# Patient Record
Sex: MALE | Race: WHITE | Employment: FULL TIME | URBAN - METROPOLITAN AREA
[De-identification: names, ages, dates, MRNs, and addresses within clinical notes are randomized per-mention and may not be internally consistent; named-entity substitution may affect disease eponyms.]

---

## 2022-03-19 PROBLEM — E78.1 HYPERTRIGLYCERIDEMIA: Status: ACTIVE | Noted: 2019-02-20

## 2022-07-30 NOTE — PROGRESS NOTES
Date: 2022      Name: Mariama Mcguire      MRN: [id number]       : 1980       Age: 37 y.o. Sex: male        Samanta Key MD       CC:  No chief complaint on file. HPI:     Mariama Mcguire is a 37 y.o. male who presents for evaluation of an umbilical hernia as a referral from Dr. Ellis Mohs. The hernia has been present for 2-3 years. It has increased in size. It is painful at times. The patient has an anterior chest wall soft tissue mass which is painful at times. The lesion has drained periodically. No trauma reported. PMH:    No past medical history on file. PSH:    No past surgical history on file. MEDS:    Prior to Visit Medications    Not on File       ALLERGIES:      Not on File    SH:         FH:    No family history on file. ROS: The patient has no difficulty with chest pain or shortness of breath. No fever or chills. Comprehensive 13 point review of systems was otherwise unremarkable except as noted above. Physical Exam:     There were no vitals taken for this visit. General: Alert, oriented, cooperative white male in no acute distress. Eyes: Sclera are clear. Conjunctiva and lids within normal limits. No icterus. Ears and Nose: no gross deformities to visual inspection, gross hearing intact  Neck: Supple, trachea midline, no appreciable thyromegaly  Resp: Breathing is  non-labored. Lungs clear to auscultation without wheezing or rhonchi   CV: RRR. No murmurs, rubs or gallops appreciated. Abd: soft, reducible umbilical hernia, active BS'S. Chest: mid-sternal region with an upraised STM having a diameter of 2 cm. No signs of infection. No drainage. Psych:  Mood and affect appropriate. Short-term memory and understanding intact      Assessment/Plan:  Mariama Mcguire is a 37 y.o. male who has signs and symptoms consistent with an umbilical hernia. 1. Open umbilical hernia repair with mesh with excision of an anterior chest wall soft tissue mass.      I went over the risks of bleeding, infection, anesthesia, injury to the small bowel, large bowel, bladder and the risk of recurrence of the hernia. I discussed the use of mesh with its attendant potential problems. I highlighted how important it was to follow the post-op instructions, particularly the lifting restrictions. The patient understood the risks and wished to proceed.     Felicia Vanegas MD  [facility]   7/30/2022  11:46 AM

## 2022-08-04 ENCOUNTER — OFFICE VISIT (OUTPATIENT)
Dept: SURGERY | Age: 42
End: 2022-08-04
Payer: COMMERCIAL

## 2022-08-04 VITALS — WEIGHT: 212.1 LBS | HEART RATE: 83 BPM | DIASTOLIC BLOOD PRESSURE: 76 MMHG | SYSTOLIC BLOOD PRESSURE: 128 MMHG

## 2022-08-04 DIAGNOSIS — K42.9 UMBILICAL HERNIA WITHOUT OBSTRUCTION AND WITHOUT GANGRENE: Primary | ICD-10-CM

## 2022-08-04 DIAGNOSIS — M79.89 SOFT TISSUE MASS: ICD-10-CM

## 2022-08-04 PROCEDURE — 99203 OFFICE O/P NEW LOW 30 MIN: CPT | Performed by: SURGERY

## 2022-08-04 PROCEDURE — G8427 DOCREV CUR MEDS BY ELIG CLIN: HCPCS | Performed by: SURGERY

## 2022-08-04 PROCEDURE — G8421 BMI NOT CALCULATED: HCPCS | Performed by: SURGERY

## 2022-08-04 PROCEDURE — 1036F TOBACCO NON-USER: CPT | Performed by: SURGERY

## 2022-08-04 RX ORDER — IBUPROFEN 200 MG
200 TABLET ORAL EVERY 6 HOURS PRN
COMMUNITY

## 2022-08-04 RX ORDER — CETIRIZINE HYDROCHLORIDE 10 MG/1
10 TABLET ORAL DAILY
COMMUNITY

## 2022-10-02 NOTE — H&P
Date: 10/2/2022      Name: Garrett Negrete      MRN: [id number]       : 1980       Age: 40 y.o. Sex: male        Davian Bennett MD       CC:  No chief complaint on file. HPI:     Garrett Negrete is a 40 y.o. male who presents for evaluation of an umbilical hernia as a referral from Dr. Aurora Alarcon. The hernia has been present for 2-3 years. It has increased in size. It is painful at times. The patient has an anterior chest wall soft tissue mass which is painful at times. The lesion has drained periodically. No trauma reported. PMH:    No past medical history on file. PSH:    Past Surgical History:   Procedure Laterality Date    ORTHOPEDIC SURGERY      acl repair left knee, [id number]- left knee meniscus, right ankle [id number], right ankle - has three screws       MEDS:    Prior to Visit Medications    Medication Sig Taking? Authorizing Provider   cetirizine (ZYRTEC) 10 MG tablet Take 10 mg by mouth in the morning. Historical Provider, MD   ibuprofen (ADVIL;MOTRIN) 200 MG tablet Take 200 mg by mouth every 6 hours as needed for Pain  Historical Provider, MD       ALLERGIES:      No Known Allergies    SH:    Social History     Tobacco Use    Smoking status: Never    Smokeless tobacco: Never       FH:    No family history on file. ROS: The patient has no difficulty with chest pain or shortness of breath. No fever or chills. Comprehensive 13 point review of systems was otherwise unremarkable except as noted above. Physical Exam:     There were no vitals taken for this visit. General: Alert, oriented, cooperative white male in no acute distress. Eyes: Sclera are clear. Conjunctiva and lids within normal limits. No icterus. Ears and Nose: no gross deformities to visual inspection, gross hearing intact  Neck: Supple, trachea midline, no appreciable thyromegaly  Resp: Breathing is  non-labored. Lungs clear to auscultation without wheezing or rhonchi   CV: RRR.  No murmurs, rubs or gallops appreciated. Abd: soft, reducible umbilical hernia, active BS'S. Chest: mid-sternal region with an upraised STM having a diameter of 2 cm. No signs of infection. No drainage. Psych:  Mood and affect appropriate. Short-term memory and understanding intact      Assessment/Plan:  Jose C Corona is a 40 y.o. male who has signs and symptoms consistent with an umbilical hernia. 1. Open umbilical hernia repair with mesh with excision of an anterior chest wall soft tissue mass. I went over the risks of bleeding, infection, anesthesia, injury to the small bowel, large bowel, bladder and the risk of recurrence of the hernia. I discussed the use of mesh with its attendant potential problems. I highlighted how important it was to follow the post-op instructions, particularly the lifting restrictions. The patient understood the risks and wished to proceed.     Rambo Handley MD  [facility]   10/2/2022  8:32 AM

## 2022-10-06 ENCOUNTER — ANESTHESIA EVENT (OUTPATIENT)
Dept: SURGERY | Age: 42
End: 2022-10-06

## 2022-10-06 RX ORDER — ACETAMINOPHEN 500 MG
1000 TABLET ORAL 2 TIMES DAILY
COMMUNITY
Start: 2022-10-06 | End: 2022-10-07

## 2022-10-06 RX ORDER — LANOLIN ALCOHOL/MO/W.PET/CERES
3 CREAM (GRAM) TOPICAL DAILY
COMMUNITY

## 2022-10-06 NOTE — PERIOP NOTE
Phone pre-assessment completed. Verified name&  . Order to obtain consent  found in EHR &  matches case posting. Type 2 surgery,  assessment complete. Orders received. Labs per surgeon: none  Labs per anesthesia protocol: none      Medical/surgical history questions answered at their best of ability. All prior to admission medications reviewed and documented in [facility]. Instructed to take ONLY THE FOLLOWING MEDICATIONS ON THE DAY OF SURGERY according to anesthesia guidelines with sips of water: none . VERBALIZES UNDERSTANDING TO HOLD ALL VITAMINS AND SUPPLEMENTS  and NSAIDS (aspirin, naproxen, ibuprofen) IMMEDIATELY PER ANESTHESIA PROTOCOL. Instructed on the following:    > Arrive at MAIN Entrance, time of arrival to be called the day before by 1700  > NPO after midnight including gum, mints, and ice chips  > Responsible adult must drive patient to the hospital, stay during surgery, and patient will need supervision 24 hours after anesthesia  > Use antibacterial soap in shower the night before surgery and on the morning of surgery  > All piercings must be removed prior to arrival.    > Leave all valuables (money and jewelry) at home but bring insurance card and ID on DOS.   > You may be required to pay a deductible or co-pay on the day of your procedure. You can pre-pay by calling [phone] if your surgery is at the [facility] or [phone] if your surgery is at the [facility]. > Do not wear make-up, nail polish, lotions, cologne, perfumes, powders, or oil on skin. Artificial nails are not permitted. Teach back successful and demonstrates knowledge of instruction. You will received a call from the pre-op nurse by 5 pm on the business day prior to the scheduled procedure. If you have not spoken with a nurse, please check your voicemail. If you have not received an arrival time by 5 pm, please call [phone].

## 2022-10-06 NOTE — PERIOP NOTE
Unable to leave message at patient contact due to \"voicemail full\" and alternate contact had no voicemail available. Procedure on 10/7 with Dr. Cortes Vargas. OR time of 1420 with pre-op arrival of 1200.

## 2022-10-06 NOTE — PERIOP NOTE
Directly informed patient of pre op arrival time 1200 on 10/7. All questions answered. Pre op instructions reviewed. Left contact information for any additional questions or needs.

## 2022-10-07 ENCOUNTER — ANESTHESIA (OUTPATIENT)
Dept: SURGERY | Age: 42
End: 2022-10-07

## 2022-10-07 ENCOUNTER — HOSPITAL ENCOUNTER (OUTPATIENT)
Age: 42
Setting detail: OUTPATIENT SURGERY
Discharge: HOME | End: 2022-10-07
Attending: SURGERY | Admitting: SURGERY
Payer: COMMERCIAL

## 2022-10-07 VITALS
DIASTOLIC BLOOD PRESSURE: 57 MMHG | SYSTOLIC BLOOD PRESSURE: 101 MMHG | TEMPERATURE: 98 F | RESPIRATION RATE: 16 BRPM | HEART RATE: 55 BPM | WEIGHT: 204.2 LBS | HEIGHT: 70 IN | OXYGEN SATURATION: 92 % | BODY MASS INDEX: 29.23 KG/M2

## 2022-10-07 DIAGNOSIS — K42.9 UMBILICAL HERNIA WITHOUT OBSTRUCTION AND WITHOUT GANGRENE: Primary | ICD-10-CM

## 2022-10-07 PROCEDURE — 49585 REPAIR UMBILICAL HERN,5+Y/O,REDUC: CPT | Performed by: SURGERY

## 2022-10-07 PROCEDURE — 3700000001 HC ADD 15 MINUTES (ANESTHESIA): Performed by: SURGERY

## 2022-10-07 PROCEDURE — 6360000002 HC RX W HCPCS: Performed by: ANESTHESIOLOGY

## 2022-10-07 PROCEDURE — 2709999900 HC NON-CHARGEABLE SUPPLY: Performed by: SURGERY

## 2022-10-07 PROCEDURE — C1781 MESH (IMPLANTABLE): HCPCS | Performed by: SURGERY

## 2022-10-07 PROCEDURE — 2580000003 HC RX 258: Performed by: ANESTHESIOLOGY

## 2022-10-07 PROCEDURE — 3600000003 HC SURGERY LEVEL 3 BASE: Performed by: SURGERY

## 2022-10-07 PROCEDURE — 3600000013 HC SURGERY LEVEL 3 ADDTL 15MIN: Performed by: SURGERY

## 2022-10-07 PROCEDURE — 6370000000 HC RX 637 (ALT 250 FOR IP): Performed by: ANESTHESIOLOGY

## 2022-10-07 PROCEDURE — 7100000010 HC PHASE II RECOVERY - FIRST 15 MIN: Performed by: SURGERY

## 2022-10-07 PROCEDURE — 6360000002 HC RX W HCPCS: Performed by: NURSE ANESTHETIST, CERTIFIED REGISTERED

## 2022-10-07 PROCEDURE — 11404 EXC TR-EXT B9+MARG 3.1-4 CM: CPT | Performed by: SURGERY

## 2022-10-07 PROCEDURE — 3700000000 HC ANESTHESIA ATTENDED CARE: Performed by: SURGERY

## 2022-10-07 PROCEDURE — 2500000003 HC RX 250 WO HCPCS: Performed by: NURSE ANESTHETIST, CERTIFIED REGISTERED

## 2022-10-07 PROCEDURE — 6360000002 HC RX W HCPCS: Performed by: SURGERY

## 2022-10-07 PROCEDURE — 7100000001 HC PACU RECOVERY - ADDTL 15 MIN: Performed by: SURGERY

## 2022-10-07 PROCEDURE — 88304 TISSUE EXAM BY PATHOLOGIST: CPT

## 2022-10-07 PROCEDURE — 7100000000 HC PACU RECOVERY - FIRST 15 MIN: Performed by: SURGERY

## 2022-10-07 PROCEDURE — 2500000003 HC RX 250 WO HCPCS: Performed by: SURGERY

## 2022-10-07 PROCEDURE — 7100000011 HC PHASE II RECOVERY - ADDTL 15 MIN: Performed by: SURGERY

## 2022-10-07 DEVICE — IMPLANTABLE DEVICE: Type: IMPLANTABLE DEVICE | Site: UMBILICAL | Status: FUNCTIONAL

## 2022-10-07 RX ORDER — SODIUM CHLORIDE 9 MG/ML
INJECTION, SOLUTION INTRAVENOUS PRN
Status: DISCONTINUED | OUTPATIENT
Start: 2022-10-07 | End: 2022-10-07 | Stop reason: HOSPADM

## 2022-10-07 RX ORDER — HYDROMORPHONE HYDROCHLORIDE 2 MG/ML
0.5 INJECTION, SOLUTION INTRAMUSCULAR; INTRAVENOUS; SUBCUTANEOUS EVERY 5 MIN PRN
Status: DISCONTINUED | OUTPATIENT
Start: 2022-10-07 | End: 2022-10-07 | Stop reason: HOSPADM

## 2022-10-07 RX ORDER — PROPOFOL 10 MG/ML
INJECTION, EMULSION INTRAVENOUS PRN
Status: DISCONTINUED | OUTPATIENT
Start: 2022-10-07 | End: 2022-10-07 | Stop reason: SDUPTHER

## 2022-10-07 RX ORDER — ONDANSETRON 2 MG/ML
INJECTION INTRAMUSCULAR; INTRAVENOUS PRN
Status: DISCONTINUED | OUTPATIENT
Start: 2022-10-07 | End: 2022-10-07 | Stop reason: SDUPTHER

## 2022-10-07 RX ORDER — LIDOCAINE HYDROCHLORIDE 10 MG/ML
1 INJECTION, SOLUTION INFILTRATION; PERINEURAL
Status: DISCONTINUED | OUTPATIENT
Start: 2022-10-07 | End: 2022-10-07 | Stop reason: HOSPADM

## 2022-10-07 RX ORDER — OXYCODONE HYDROCHLORIDE AND ACETAMINOPHEN 5; 325 MG/1; MG/1
1 TABLET ORAL EVERY 6 HOURS PRN
Qty: 20 TABLET | Refills: 0 | Status: SHIPPED | OUTPATIENT
Start: 2022-10-07 | End: 2022-10-12

## 2022-10-07 RX ORDER — SODIUM CHLORIDE 0.9 % (FLUSH) 0.9 %
5-40 SYRINGE (ML) INJECTION PRN
Status: DISCONTINUED | OUTPATIENT
Start: 2022-10-07 | End: 2022-10-07 | Stop reason: HOSPADM

## 2022-10-07 RX ORDER — SODIUM CHLORIDE 0.9 % (FLUSH) 0.9 %
5-40 SYRINGE (ML) INJECTION EVERY 12 HOURS SCHEDULED
Status: DISCONTINUED | OUTPATIENT
Start: 2022-10-07 | End: 2022-10-07 | Stop reason: HOSPADM

## 2022-10-07 RX ORDER — MIDAZOLAM HYDROCHLORIDE 2 MG/2ML
2 INJECTION, SOLUTION INTRAMUSCULAR; INTRAVENOUS
Status: DISCONTINUED | OUTPATIENT
Start: 2022-10-07 | End: 2022-10-07 | Stop reason: HOSPADM

## 2022-10-07 RX ORDER — FENTANYL CITRATE 50 UG/ML
INJECTION, SOLUTION INTRAMUSCULAR; INTRAVENOUS PRN
Status: DISCONTINUED | OUTPATIENT
Start: 2022-10-07 | End: 2022-10-07 | Stop reason: SDUPTHER

## 2022-10-07 RX ORDER — SODIUM CHLORIDE, SODIUM LACTATE, POTASSIUM CHLORIDE, CALCIUM CHLORIDE 600; 310; 30; 20 MG/100ML; MG/100ML; MG/100ML; MG/100ML
INJECTION, SOLUTION INTRAVENOUS CONTINUOUS
Status: DISCONTINUED | OUTPATIENT
Start: 2022-10-07 | End: 2022-10-07 | Stop reason: HOSPADM

## 2022-10-07 RX ORDER — ACETAMINOPHEN 500 MG
1000 TABLET ORAL ONCE
Status: COMPLETED | OUTPATIENT
Start: 2022-10-07 | End: 2022-10-07

## 2022-10-07 RX ORDER — KETOROLAC TROMETHAMINE 30 MG/ML
INJECTION, SOLUTION INTRAMUSCULAR; INTRAVENOUS PRN
Status: DISCONTINUED | OUTPATIENT
Start: 2022-10-07 | End: 2022-10-07 | Stop reason: SDUPTHER

## 2022-10-07 RX ORDER — ROCURONIUM BROMIDE 10 MG/ML
INJECTION, SOLUTION INTRAVENOUS PRN
Status: DISCONTINUED | OUTPATIENT
Start: 2022-10-07 | End: 2022-10-07 | Stop reason: SDUPTHER

## 2022-10-07 RX ORDER — DEXAMETHASONE SODIUM PHOSPHATE 4 MG/ML
INJECTION, SOLUTION INTRA-ARTICULAR; INTRALESIONAL; INTRAMUSCULAR; INTRAVENOUS; SOFT TISSUE PRN
Status: DISCONTINUED | OUTPATIENT
Start: 2022-10-07 | End: 2022-10-07 | Stop reason: SDUPTHER

## 2022-10-07 RX ORDER — LIDOCAINE HYDROCHLORIDE 20 MG/ML
INJECTION, SOLUTION EPIDURAL; INFILTRATION; INTRACAUDAL; PERINEURAL PRN
Status: DISCONTINUED | OUTPATIENT
Start: 2022-10-07 | End: 2022-10-07 | Stop reason: SDUPTHER

## 2022-10-07 RX ORDER — OXYCODONE HYDROCHLORIDE 5 MG/1
5 TABLET ORAL
Status: COMPLETED | OUTPATIENT
Start: 2022-10-07 | End: 2022-10-07

## 2022-10-07 RX ORDER — MEPERIDINE HYDROCHLORIDE 25 MG/ML
12.5 INJECTION INTRAMUSCULAR; INTRAVENOUS; SUBCUTANEOUS EVERY 5 MIN PRN
Status: DISCONTINUED | OUTPATIENT
Start: 2022-10-07 | End: 2022-10-07 | Stop reason: HOSPADM

## 2022-10-07 RX ORDER — PROCHLORPERAZINE EDISYLATE 5 MG/ML
5 INJECTION INTRAMUSCULAR; INTRAVENOUS
Status: DISCONTINUED | OUTPATIENT
Start: 2022-10-07 | End: 2022-10-07 | Stop reason: HOSPADM

## 2022-10-07 RX ORDER — ONDANSETRON 2 MG/ML
4 INJECTION INTRAMUSCULAR; INTRAVENOUS
Status: COMPLETED | OUTPATIENT
Start: 2022-10-07 | End: 2022-10-07

## 2022-10-07 RX ORDER — BUPIVACAINE HYDROCHLORIDE 5 MG/ML
INJECTION, SOLUTION EPIDURAL; INTRACAUDAL PRN
Status: DISCONTINUED | OUTPATIENT
Start: 2022-10-07 | End: 2022-10-07 | Stop reason: HOSPADM

## 2022-10-07 RX ADMIN — ACETAMINOPHEN 1000 MG: 500 TABLET, FILM COATED ORAL at 12:39

## 2022-10-07 RX ADMIN — FENTANYL CITRATE 100 MCG: 50 INJECTION, SOLUTION INTRAMUSCULAR; INTRAVENOUS at 15:11

## 2022-10-07 RX ADMIN — DEXAMETHASONE SODIUM PHOSPHATE 4 MG: 4 INJECTION, SOLUTION INTRAMUSCULAR; INTRAVENOUS at 15:18

## 2022-10-07 RX ADMIN — SODIUM CHLORIDE, SODIUM LACTATE, POTASSIUM CHLORIDE, AND CALCIUM CHLORIDE: 600; 310; 30; 20 INJECTION, SOLUTION INTRAVENOUS at 15:35

## 2022-10-07 RX ADMIN — ONDANSETRON 4 MG: 2 INJECTION INTRAMUSCULAR; INTRAVENOUS at 16:01

## 2022-10-07 RX ADMIN — SODIUM CHLORIDE, SODIUM LACTATE, POTASSIUM CHLORIDE, AND CALCIUM CHLORIDE: 600; 310; 30; 20 INJECTION, SOLUTION INTRAVENOUS at 13:02

## 2022-10-07 RX ADMIN — PROPOFOL 200 MG: 10 INJECTION, EMULSION INTRAVENOUS at 15:11

## 2022-10-07 RX ADMIN — ROCURONIUM BROMIDE 35 MG: 50 INJECTION, SOLUTION INTRAVENOUS at 15:11

## 2022-10-07 RX ADMIN — OXYCODONE HYDROCHLORIDE 5 MG: 5 TABLET ORAL at 16:01

## 2022-10-07 RX ADMIN — ONDANSETRON 4 MG: 2 INJECTION INTRAMUSCULAR; INTRAVENOUS at 15:18

## 2022-10-07 RX ADMIN — KETOROLAC TROMETHAMINE 30 MG: 30 INJECTION, SOLUTION INTRAMUSCULAR; INTRAVENOUS at 15:18

## 2022-10-07 RX ADMIN — HYDROMORPHONE HYDROCHLORIDE 0.5 MG: 2 INJECTION, SOLUTION INTRAMUSCULAR; INTRAVENOUS; SUBCUTANEOUS at 16:21

## 2022-10-07 RX ADMIN — Medication 2000 MG: at 15:15

## 2022-10-07 RX ADMIN — LIDOCAINE HYDROCHLORIDE 100 MG: 20 INJECTION, SOLUTION EPIDURAL; INFILTRATION; INTRACAUDAL; PERINEURAL at 15:11

## 2022-10-07 ASSESSMENT — PAIN DESCRIPTION - LOCATION
LOCATION: ABDOMEN
LOCATION: ABDOMEN

## 2022-10-07 ASSESSMENT — PAIN SCALES - GENERAL
PAINLEVEL_OUTOF10: 4
PAINLEVEL_OUTOF10: 2

## 2022-10-07 ASSESSMENT — PAIN - FUNCTIONAL ASSESSMENT
PAIN_FUNCTIONAL_ASSESSMENT: 0-10
PAIN_FUNCTIONAL_ASSESSMENT: NONE - DENIES PAIN
PAIN_FUNCTIONAL_ASSESSMENT: 0-10

## 2022-10-07 NOTE — BRIEF OP NOTE
Brief Postoperative Note      Patient: Arnetha Cabot  YOB: 1980  MRN: [id number]    Date of Procedure: 10/7/2022    Pre-Op Diagnosis: Umbilical hernia without obstruction or gangrene [K42.9]  Mass of soft tissue [M79.89]    Post-Op Diagnosis: Same       Procedure: 1. Open umbilical hernia repair with mesh  2. Excision of an anterior chest wall soft tissue mass measuring 4 cm x 2cm x 2 cm    Surgeon(s):  Carlos Alberto Liriano MD    Assistant:  * No surgical staff found *    Anesthesia: General    Estimated Blood Loss (mL): Minimal    Complications: None    Specimens:   ID Type Source Tests Collected by Time Destination   A : Anterior Chest Wall Soft Tissue Mass Tissue Chest SURGICAL PATHOLOGY Carlos Alberto Liriano MD 10/7/2022 1532        Implants:  Implant Name Type Inv. Item Serial No.  Lot No. LRB No. Used Action   MESH KELY SM DIA4. 3CM VENTRAL POLYPR EPTFE CIR SELF EXP - [id number]  MESH KELY SM DIA4. 3CM VENTRAL POLYPR EPTFE CIR SELF EXP  BARD DAVOL-WD [id number] N/A 1 Implanted         Drains: * No LDAs found *    Findings: See dictated note    Electronically signed by Angela Juares MD on 10/7/2022 at 3:39 PM

## 2022-10-07 NOTE — ANESTHESIA POSTPROCEDURE EVALUATION
Department of Anesthesiology  Postprocedure Note    Patient: Ton Limon  MRN: [id number]  YOB: 1980  Date of evaluation: 10/7/2022      Procedure Summary     Date: 10/07/22 Room / Location: [facility] MAIN OR 07 / [facility] MAIN OR    Anesthesia Start: 1504 Anesthesia Stop: 5451    Procedures: HERNIA UMBILICAL REPAIR WITH MESH (Abdomen)      CHEST WALL LESION BIOPSY EXCISION (Chest) Diagnosis:       Umbilical hernia without obstruction or gangrene      Mass of soft tissue      (Umbilical hernia without obstruction or gangrene [K42.9])      (Mass of soft tissue [M79.89])    Providers: Justice Valerio MD Responsible Provider: Ozzy Mcclendon MD    Anesthesia Type: general ASA Status: 2          Anesthesia Type: No value filed.     Casandra Phase I: Casandra Score: 10    Casandra Phase II: Casandra Score: 10      Anesthesia Post Evaluation    Patient location during evaluation: PACU  Patient participation: complete - patient participated  Level of consciousness: awake and awake and alert  Airway patency: patent  Nausea & Vomiting: no nausea  Complications: no  Cardiovascular status: hemodynamically stable  Respiratory status: acceptable  Hydration status: euvolemic  Multimodal analgesia pain management approach

## 2022-10-07 NOTE — ANESTHESIA PROCEDURE NOTES
Airway  Date/Time: 10/7/2022 3:14 PM  Urgency: elective    Airway not difficult    General Information and Staff    Patient location during procedure: OR  Resident/CRNA: KAYLEY Candelario - CRNA    Indications and Patient Condition  Indications for airway management: anesthesia  Spontaneous Ventilation: absent  Sedation level: deep  Preoxygenated: yes  Patient position: sniffing  MILS maintained throughout  Mask difficulty assessment: vent by bag mask + OA or adjuvant +/- NMBA    Final Airway Details  Final airway type: endotracheal airway      Successful airway: ETT  Cuffed: yes   Successful intubation technique: direct laryngoscopy  Facilitating devices/methods: intubating stylet and cricoid pressure  Endotracheal tube insertion site: oral  Blade: Anna  Blade size: #3  ETT size (mm): 8.0  Cormack-Lehane Classification: grade I - full view of glottis  Placement verified by: chest auscultation and capnometry   Measured from: teeth  ETT to teeth (cm): 23  Number of attempts at approach: 1  Ventilation between attempts: bag mask  Number of other approaches attempted: 0    no

## 2022-10-07 NOTE — ANESTHESIA PRE PROCEDURE
Department of Anesthesiology  Preprocedure Note       Name:  Dotty Marie   Age:  40 y.o.  :  1980                                          MRN:  [id number]         Date:  10/7/2022      Surgeon: Sheri Ashraf):  Radha Solis MD    Procedure: Procedure(s): HERNIA UMBILICAL REPAIR WITH MESH  CHEST WALL LESION BIOPSY EXCISION    Medications prior to admission:   Prior to Admission medications    Medication Sig Start Date End Date Taking? Authorizing Provider   melatonin 3 MG TABS tablet Take 3 mg by mouth daily   Yes Historical Provider, MD   acetaminophen (TYLENOL) 500 MG tablet Take 1,000 mg by mouth in the morning and at bedtime 10/6/22 10/7/22 Yes Historical Provider, MD   cetirizine (ZYRTEC) 10 MG tablet Take 10 mg by mouth in the morning.     Historical Provider, MD   ibuprofen (ADVIL;MOTRIN) 200 MG tablet Take 200 mg by mouth every 6 hours as needed for Pain    Historical Provider, MD       Current medications:    Current Facility-Administered Medications   Medication Dose Route Frequency Provider Last Rate Last Admin    [START ON 10/8/2022] ceFAZolin (ANCEF) 2000 mg in sterile water 20 mL IV syringe  2,000 mg IntraVENous On Call Radha Solis MD        lidocaine 1 % injection 1 mL  1 mL IntraDERmal Once PRN Jonathan Allred MD        lactated ringers infusion   IntraVENous Continuous Jonathan Allred MD        sodium chloride flush 0.9 % injection 5-40 mL  5-40 mL IntraVENous 2 times per day Jonathan Allred MD        sodium chloride flush 0.9 % injection 5-40 mL  5-40 mL IntraVENous PRN Jonathan Allred MD        0.9 % sodium chloride infusion   IntraVENous PRN Jonathan Allred MD        midazolam PF (VERSED) injection 2 mg  2 mg IntraVENous Once PRN Jonathan Allred MD           Allergies:  No Known Allergies    Problem List:    Patient Active Problem List   Diagnosis Code    ADD (attention deficit disorder) F98.8    Hypertriglyceridemia E78.1    Blood pressure elevated without history of HTN R03.0       Past Medical History:        Diagnosis Date    Palpitations     Prolonged emergence from general anesthesia 1997    after ACL repair       Past Surgical History:        Procedure Laterality Date    ANTERIOR CRUCIATE LIGAMENT REPAIR Left 1997    KNEE ARTHROSCOPY Left 2009    ORTHOPEDIC SURGERY  2002    right ankle- screws/pins       Social History:    Social History     Tobacco Use    Smoking status: Never    Smokeless tobacco: Never   Substance Use Topics    Alcohol use: Yes     Alcohol/week: 5.0 standard drinks     Types: 5 Cans of beer per week                                Counseling given: Not Answered      Vital Signs (Current):   Vitals:    10/06/22 1504 10/07/22 1219   BP:  134/79   Pulse:  56   Resp:  12   Temp:  98.5 °F (36.9 °C)   TempSrc:  Oral   SpO2:  98%   Weight: 205 lb (93 kg) 204 lb 3.2 oz (92.6 kg)   Height: 5' 10\" (1.778 m) 5' 10\" (1.778 m)                                              BP Readings from Last 3 Encounters:   10/07/22 134/79   08/04/22 128/76       NPO Status: Time of last liquid consumption: 2200                        Time of last solid consumption: 2200                        Date of last liquid consumption: 10/06/22                        Date of last solid food consumption: 10/06/22    BMI:   Wt Readings from Last 3 Encounters:   10/07/22 204 lb 3.2 oz (92.6 kg)   08/04/22 212 lb 1.6 oz (96.2 kg)     Body mass index is 29.3 kg/m². CBC: No results found for: WBC, RBC, HGB, HCT, MCV, RDW, PLT    CMP: No results found for: NA, K, CL, CO2, BUN, CREATININE, GFRAA, AGRATIO, LABGLOM, GLUCOSE, GLU, PROT, CALCIUM, BILITOT, ALKPHOS, AST, ALT    POC Tests: No results for input(s): POCGLU, POCNA, POCK, POCCL, POCBUN, POCHEMO, POCHCT in the last 72 hours.     Coags: No results found for: PROTIME, INR, APTT    HCG (If Applicable): No results found for: PREGTESTUR, PREGSERUM, HCG, HCGQUANT     ABGs: No results found for: PHART, PO2ART, BOT2HNQ, PIS3LVK, BEART, D6VYOWNK Type & Screen (If Applicable):  No results found for: LABABO, LABRH    Drug/Infectious Status (If Applicable):  No results found for: HIV, HEPCAB    COVID-19 Screening (If Applicable): No results found for: COVID19        Anesthesia Evaluation  Patient summary reviewed  Airway: Mallampati: I  TM distance: >3 FB   Neck ROM: full     Dental: normal exam         Pulmonary:Negative Pulmonary ROS and normal exam  breath sounds clear to auscultation                             Cardiovascular:Negative CV ROS  Exercise tolerance: good (>4 METS),           Rhythm: regular  Rate: normal                 ROS comment: Pt denies recent CP, SOB or changes in functional status     Neuro/Psych:   Negative Neuro/Psych ROS              GI/Hepatic/Renal: Neg GI/Hepatic/Renal ROS            Endo/Other:                      ROS comment: Obesity Abdominal:              PE comment: Deferred   Vascular: negative vascular ROS. Other Findings:           Anesthesia Plan      general     ASA 2       Induction: intravenous. Anesthetic plan and risks discussed with patient and spouse.                         Franci Edmonds MD   10/7/2022

## 2022-10-07 NOTE — INTERVAL H&P NOTE
Update History & Physical    The patient's History and Physical of 10/2/22 was reviewed with the patient and I examined the patient. There was no change. The surgical site was confirmed by the patient and me. Plan: The risks, benefits, expected outcome, and alternative to the recommended procedure have been discussed with the patient. Patient understands and wants to proceed with the procedure.      Electronically signed by Gardenia Worley MD on 10/7/2022 at 11:54 AM

## 2022-10-08 NOTE — OP NOTE
[facility]  OPERATIVE REPORT    Name:  Romeo Sosa  MR#:  [id number]  :  1980  ACCOUNT #:  [de-identified]  DATE OF SERVICE:  10/07/2022    PREOPERATIVE DIAGNOSES:  1.  Umbilical hernia. 2.  Anterior chest wall soft tissue mass. POSTOPERATIVE DIAGNOSES:  1.  Umbilical hernia containing omentum. 2.  Anterior chest wall soft tissue mass, appeared to be epidermal inclusion cyst.    PROCEDURES PERFORMED:  1. Open umbilical hernia repair with mesh using a small Ventralex mesh. 2.  Excision of anterior chest wall soft tissue mass measuring 4 cm x 2 cm x 2 cm which consisted of skin and soft tissue. SURGEON:  Angelita Dawn MD    ASSISTANT:  None. ANESTHESIA:  General endotracheal anesthesia with Dr. Geremias Hernandez plus 20 mL of 0.5% Marcaine used in 10 mL dose at each surgical site. COMPLICATIONS:  None. SPECIMENS REMOVED:  Anterior chest wall soft tissue mass. IMPLANTS:  None. ESTIMATED BLOOD LOSS:  20 cc's. DRAINS:  None. HISTORY:  This is a 59-year-old male who I saw at the request of Dr. Lindsey Moya. He had an umbilical hernia, as well as a soft tissue mass in the anterior chest which he says gets swollen and has drained several times. He wanted to have it excised. Recommended umbilical hernia repair and excision of the chest wall soft tissue mass at the same time. I went through risks of bleeding, infection, anesthesia, injury to the small bowel, large bowel, recurrence of the soft tissue mass and recurrence of the hernia. He was agreeable and signed the consent form. PROCEDURE:  The patient was seen in the preop area. The anterior chest wall lesion which was right over the sternum was marked with a marking pen as was the umbilical hernia. He was taken to room #7 [address] [place] [address] operating room where general endotracheal anesthesia was administered without complications.   The patient's abdomen and anterior chest already been shaved and now they are prepped and draped. I did a time-out identifying the patient, surgeon, procedure and his birth date of 1980. When everyone in the room agreed, I began the procedure. I made a curvilinear incision at the base of the umbilicus dividing the skin and soft tissues divided with electrocautery. I then raised the umbilical hernia sac away from the overlying umbilical skin. The hernia sac was excised and removed from the field. I did not feel there was any pathologic value, so was not sent to Pathology Department. I put my finger in the abdomen and swept around. There were no adhesions noted. I made flaps. I raised flaps over the fascia circumferentially. Small Ventralex mesh was brought onto the field, rolled up like a taco and placed in the abdominal cavity. It was unfurled. I put my finger in to check and everything was laying in good position. I then closed the fascial defect with a combination of horizontal mattress and simple sutures of 0 Prolene. I cut the straps off the mesh. The wound was irrigated. Hemostasis achieved with electrocautery. I then recreated the umbilicus by tacking it down to the underlying fascia with two interrupted 3-0 Vicryl sutures. The skin was closed with surgical staples. We then took care of the anterior chest wall soft tissue mass. I made an elliptical incision around this upraised palpable lesion in the midportion of the sternum. The pattern was followed with 15-scalpel blade. Soft tissues divided with electrocautery. I excised a segment of tissue. It was 4 cm x 2 cm x 2 cm and contained the entire cyst, which appeared to be an epidermal inclusion cyst.  This was removed and sent to pathology for evaluation. Flaps raised circumferentially. Wound irrigated. Hemostasis achieved with electrocautery. Skin closed with surgical staples. I injected 20 mL of 0.5% Marcaine in 10 mL doses to the anterior chest wall, surgical site and the umbilical hernia repair site. Dry sterile dressings were applied. The patient was extubated, brought to recovery room in stable condition. He should be able to go home later today.         Raj Sweeney MD      [id number]  D:  10/07/2022 15:47  T:  10/07/2022 22:42  JOB #:  [id number]

## 2022-10-10 ASSESSMENT — ENCOUNTER SYMPTOMS
NAUSEA: 0
GASTROINTESTINAL NEGATIVE: 1
VOMITING: 0
CONSTIPATION: 0

## 2022-10-10 NOTE — PROGRESS NOTES
[address] [facility]  [phone]        Name: Karlie Mendes      MRN: [id number]       : 1980             PCP: Gavin Jravis MD       CC:    Chief Complaint   Patient presents with    Post-Op Check     S/p 10/5/2022 open UHR with mesh, chest STM excision        HPI:  . Karlie Mendes is a 40y.o. year-old male who presents for a post-op visit s/p Open umbilical hernia repair with mesh  and  Excision of anterior chest wall soft tissue mass measuring 4 cm x 2 cm x 2 cm done per Dr. Rosalina Bey on 10/7/2022. Patient denies any fever or chills. Patient reports small amount of tan drainage from umbilical site last Friday and he reports \" I had an allergic reaction to the tape and band-aids \" so his skin area has some erythema similar to folliculitis. Patient reports he is tolerating a regular diet, voiding without difficulty and having normal BM's. Path Report:    Name:    Chema Mendiola Accession Number:   [id number]   MR #   [id number]   Date Obtained:   10/7/2022   DIAGNOSIS        \"ANTERIOR CHEST WALL SOFT TISSUE MASS\":  EPIDERMAL INCLUSION CYST. Microscopic Description        The specimen consists of skin containing a dermal cyst lined by   benign squamous epithelium and filled with keratinaceous debris. No   atypia is identified. Specimen(s) Received   A: Anterior Chest Wall Soft Tissue Mass     Macroscopic Description   Received in formalin labeled anterior chest wall soft tissue mass, label matching requisition and specimen consists of a fragment of skin and underlying soft tissue measuring 3.3 x 2.5 x 2.4 cm. Dissection reveals a cystic space beneath the skin measuring 1.5 cm in   greatest dimension and filled with a pale grumous substance. Objective:    Review of Systems   Constitutional:  Negative for chills and fever. Gastrointestinal: Negative. Negative for constipation, nausea and vomiting. Physical Exam  Constitutional:       Appearance: Normal appearance. Cardiovascular:      Rate and Rhythm: Normal rate and regular rhythm. Pulses: Normal pulses. Heart sounds: Normal heart sounds. Pulmonary:      Effort: Pulmonary effort is normal.      Breath sounds: Normal breath sounds. Abdominal:      General: Bowel sounds are normal.      Palpations: Abdomen is soft. Comments: Incisional site noted to Abdominal area approximated with staples with scant amount of erythema and old dried yellow drainage noted. Every other staple was removed and incision remains approximated. Skin:     Comments: Anterior chest area (incision) approximated with staples with scant amount of erythema but no drainage. Every other staple was removed and incision remains approximated. Neurological:      Mental Status: He is alert. Assessment/Plan:  40y.o. year-old male who presents for a post-op visit s/p Open umbilical hernia repair with mesh  and  Excision of anterior chest wall soft tissue mass measuring 4 cm x 2 cm x 2 cm done per Dr. Saint Clair on 10/7/2022. Copy of path given and discussed with patient.    -Follow up in one week  -Keep incisional sites clean and dry  -No heavy lifting > 20 pounds x 4 weeks.  -Do not apply any creams, lotions or ointments to incisional sites.   -Bactrim DS one PO BID x 7 days    KAYLEY Maki - NP

## 2022-10-18 ENCOUNTER — OFFICE VISIT (OUTPATIENT)
Dept: SURGERY | Age: 42
End: 2022-10-18

## 2022-10-18 DIAGNOSIS — L24.A9 WOUND DRAINAGE: Primary | ICD-10-CM

## 2022-10-18 DIAGNOSIS — Z09 POSTOPERATIVE EXAMINATION: ICD-10-CM

## 2022-10-18 PROCEDURE — 99024 POSTOP FOLLOW-UP VISIT: CPT | Performed by: NURSE PRACTITIONER

## 2022-10-18 RX ORDER — SULFAMETHOXAZOLE AND TRIMETHOPRIM 800; 160 MG/1; MG/1
1 TABLET ORAL 2 TIMES DAILY
Qty: 14 TABLET | Refills: 0 | Status: SHIPPED | OUTPATIENT
Start: 2022-10-18 | End: 2022-10-25

## 2022-10-18 ASSESSMENT — ENCOUNTER SYMPTOMS
DIARRHEA: 0
GASTROINTESTINAL NEGATIVE: 1
RESPIRATORY NEGATIVE: 1
VOMITING: 0
NAUSEA: 0
CONSTIPATION: 0

## 2022-10-18 NOTE — PROGRESS NOTES
[address] [facility]  [phone]        Name: Gurjit Solano      MRN: [id number]       : 1980             PCP: Cadence Puga MD       CC:  No chief complaint on file. HPI:  . Gurjit Solano is a 40y.o. year-old male who presents for a second post-op visit s/p Open umbilical hernia repair with mesh  and  Excision of anterior chest wall soft tissue mass measuring 4 cm x 2 cm x 2 cm done per Dr. María Valladares on 10/7/2022. Patient was seen for his first post op visit on 10/18/2022 and at that time, he reported small amount of tan drainage from umbilical site since  so patient was started on Bactrim DS one PO BID x 7 days and instructed to return today for remaining of staples to be removed. At the visit on 10/18/2022 every other staple was removed from the anterior chest incision and umbilical incision. Incisional sites remained approximated and patient was instructed to continue no heavy lifting > 20 pounds x 4 weeks. The anterior chest wound has opened. The patient was placed on an antibiotic. No fever or chills. He reports pain at the anterior chest wall site for which he is using Percocet. Objective:     Review of Systems   Constitutional:  Negative for chills and fever. Respiratory: Negative. Cardiovascular: Negative. Gastrointestinal: Negative. Negative for constipation, diarrhea, nausea and vomiting. Skin: Negative. Chest: wound open with granulation tissue present at the base. No cellulitis. Abdomen: no recurrent umbilical hernia. Wound intact    Assessment/Plan:  40y.o. year-old male who presents for a second post-op visit s/p Open umbilical hernia repair with mesh  and  Excision of anterior chest wall soft tissue mass measuring 4 cm x 2 cm x 2 cm done per Dr. María Valladares on 10/7/2022. I removed all of the umbilical wound staples. Some staples removed from chest wound.    Wet to dry gauze to open area of chest wound.  -Follow Up one week  -Keep incisional sites clean and dry  -Resume activity as tolerated with no heavy lifting > 20 pounds x 2 more weeks.        Wayne Bernard MD

## 2022-10-18 NOTE — PATIENT INSTRUCTIONS
-Follow up in one week  -Keep incisional sites clean and dry  -No heavy lifting > 20 pounds x 4 weeks.  -Do not apply any creams, lotions or ointments to incisional sites.   -Bactrim DS one PO BID x 7 days

## 2022-10-25 ENCOUNTER — OFFICE VISIT (OUTPATIENT)
Dept: SURGERY | Age: 42
End: 2022-10-25

## 2022-10-25 DIAGNOSIS — M79.89 SOFT TISSUE MASS: Primary | ICD-10-CM

## 2022-10-25 PROCEDURE — 99024 POSTOP FOLLOW-UP VISIT: CPT | Performed by: SURGERY

## 2022-10-25 RX ORDER — OXYCODONE HYDROCHLORIDE AND ACETAMINOPHEN 5; 325 MG/1; MG/1
1 TABLET ORAL EVERY 6 HOURS PRN
Qty: 20 TABLET | Refills: 0 | Status: SHIPPED | OUTPATIENT
Start: 2022-10-25 | End: 2022-10-30

## 2022-11-02 ASSESSMENT — ENCOUNTER SYMPTOMS
DIARRHEA: 0
GASTROINTESTINAL NEGATIVE: 1
NAUSEA: 0
RESPIRATORY NEGATIVE: 1
CONSTIPATION: 0
VOMITING: 0

## 2022-11-02 NOTE — PROGRESS NOTES
ALLERGIES:       No Known Allergies     SH:     Social History           Tobacco Use    Smoking status: Never    Smokeless tobacco: Never         FH:     Family History         Objective:     Review of Systems   Constitutional:  Negative for chills and fever. Respiratory: Negative. Cardiovascular: Negative. Gastrointestinal: Negative. Negative for constipation, diarrhea, nausea and vomiting. Skin: Negative. Chest: wound open with granulation tissue present at the base. No cellulitis. Abdomen: no recurrent umbilical hernia. Wound intact    Assessment/Plan:  40y.o. year-old male who presents for a second post-op visit s/p Open umbilical hernia repair with mesh  and  Excision of anterior chest wall soft tissue mass measuring 4 cm x 2 cm x 2 cm done per Dr. Donovan Jaimes on 10/7/2022. Secondary closure of an open anterior chest wall lesion in the OR. Went through the risks and benefits.        Malu Mccarthy MD

## 2022-11-03 ENCOUNTER — OFFICE VISIT (OUTPATIENT)
Dept: SURGERY | Age: 42
End: 2022-11-03

## 2022-11-03 DIAGNOSIS — S21.109D: Primary | ICD-10-CM

## 2022-11-03 PROCEDURE — 99024 POSTOP FOLLOW-UP VISIT: CPT | Performed by: SURGERY

## 2022-11-07 ENCOUNTER — PREP FOR PROCEDURE (OUTPATIENT)
Dept: SURGERY | Age: 42
End: 2022-11-07

## 2022-11-07 PROBLEM — S21.109A OPEN WOUND OF CHEST WALL WITHOUT COMPLICATION: Status: ACTIVE | Noted: 2022-11-07

## 2022-11-08 RX ORDER — OXYCODONE HYDROCHLORIDE AND ACETAMINOPHEN 5; 325 MG/1; MG/1
1 TABLET ORAL EVERY 4 HOURS PRN
COMMUNITY

## 2022-11-08 ASSESSMENT — ENCOUNTER SYMPTOMS
VOMITING: 0
DIARRHEA: 0
GASTROINTESTINAL NEGATIVE: 1
RESPIRATORY NEGATIVE: 1
CONSTIPATION: 0
NAUSEA: 0

## 2022-11-08 NOTE — H&P
[address]  [phone]        Name: Vita Claudio      MRN: [id number]       : 1980             PCP: Norma Morgan MD       CC:  No chief complaint on file. HPI:  . Vita Claudio is a 40y.o. year-old male who presents for a third post-op visit s/p Open umbilical hernia repair with mesh  and  Excision of anterior chest wall soft tissue mass measuring 4 cm x 2 cm x 2 cm done per Dr. [facility] on 10/7/2022. Patient was seen for his first post op visit on 10/18/2022 and at that time, he reported small amount of tan drainage from umbilical site since  so patient was started on Bactrim DS one PO BID x 7 days and instructed to return today for remaining of staples to be removed. At the visit on 10/18/2022 every other staple was removed from the anterior chest incision and umbilical incision. Incisional sites remained approximated and patient was instructed to continue no heavy lifting > 20 pounds x 4 weeks. The anterior chest wound has opened. The patient was placed on an antibiotic. No fever or chills. He reports pain at the anterior chest wall site for which he is using Percocet. Chest wound has completely opened. He has his own business. He would like to have this heal/resolve as quickly as possible. PMH:     Past Medical History   No past medical history on file. PSH:     Past Surgical History         Past Surgical History:   Procedure Laterality Date    ORTHOPEDIC SURGERY         acl repair left knee, [id number]- left knee meniscus, right ankle [id number], right ankle - has three screws            MEDS:           Prior to Visit Medications    Medication Sig Taking? Authorizing Provider   cetirizine (ZYRTEC) 10 MG tablet Take 10 mg by mouth in the morning.    Historical Provider, MD   ibuprofen (ADVIL;MOTRIN) 200 MG tablet Take 200 mg by mouth every 6 hours as needed for Pain   Historical Provider, MD ALLERGIES:       No Known Allergies     SH:     Social History           Tobacco Use    Smoking status: Never    Smokeless tobacco: Never         FH:     Family History         Objective:     Review of Systems   Constitutional:  Negative for chills and fever. Respiratory: Negative. Cardiovascular: Negative. Gastrointestinal: Negative. Negative for constipation, diarrhea, nausea and vomiting. Skin: Negative. Chest: wound open with granulation tissue present at the base. No cellulitis. Abdomen: no recurrent umbilical hernia. Wound intact    Assessment/Plan:  40y.o. year-old male who presents for a second post-op visit s/p Open umbilical hernia repair with mesh  and  Excision of anterior chest wall soft tissue mass measuring 4 cm x 2 cm x 2 cm done per Dr. Estuardo Montague on 10/7/2022. Secondary closure of an open anterior chest wall lesion in the OR. Went through the risks and benefits.        Catherine Castillo MD

## 2022-11-09 ENCOUNTER — ANESTHESIA EVENT (OUTPATIENT)
Dept: SURGERY | Age: 42
End: 2022-11-09
Payer: COMMERCIAL

## 2022-11-09 NOTE — PERIOP NOTE
Telephone call to patient, Laymon Regi regarding preop instructions as well as to provide preop arrival time of 0545 on 11/10/2022. Pre op instructions reviewed and all questions answered.

## 2022-11-10 ENCOUNTER — ANESTHESIA (OUTPATIENT)
Dept: SURGERY | Age: 42
End: 2022-11-10
Payer: COMMERCIAL

## 2022-11-10 ENCOUNTER — HOSPITAL ENCOUNTER (OUTPATIENT)
Age: 42
Setting detail: OUTPATIENT SURGERY
Discharge: HOME | End: 2022-11-10
Attending: SURGERY | Admitting: SURGERY
Payer: COMMERCIAL

## 2022-11-10 VITALS
RESPIRATION RATE: 15 BRPM | HEIGHT: 70 IN | WEIGHT: 208 LBS | OXYGEN SATURATION: 96 % | SYSTOLIC BLOOD PRESSURE: 130 MMHG | HEART RATE: 68 BPM | BODY MASS INDEX: 29.78 KG/M2 | DIASTOLIC BLOOD PRESSURE: 82 MMHG | TEMPERATURE: 98 F

## 2022-11-10 DIAGNOSIS — S21.109D: Primary | ICD-10-CM

## 2022-11-10 PROCEDURE — 6360000002 HC RX W HCPCS: Performed by: SURGERY

## 2022-11-10 PROCEDURE — 3600000014 HC SURGERY LEVEL 4 ADDTL 15MIN: Performed by: SURGERY

## 2022-11-10 PROCEDURE — 2500000003 HC RX 250 WO HCPCS

## 2022-11-10 PROCEDURE — 2500000003 HC RX 250 WO HCPCS: Performed by: SURGERY

## 2022-11-10 PROCEDURE — 3600000004 HC SURGERY LEVEL 4 BASE: Performed by: SURGERY

## 2022-11-10 PROCEDURE — 7100000011 HC PHASE II RECOVERY - ADDTL 15 MIN: Performed by: SURGERY

## 2022-11-10 PROCEDURE — 7100000001 HC PACU RECOVERY - ADDTL 15 MIN: Performed by: SURGERY

## 2022-11-10 PROCEDURE — 7100000000 HC PACU RECOVERY - FIRST 15 MIN: Performed by: SURGERY

## 2022-11-10 PROCEDURE — 6370000000 HC RX 637 (ALT 250 FOR IP): Performed by: ANESTHESIOLOGY

## 2022-11-10 PROCEDURE — 2709999900 HC NON-CHARGEABLE SUPPLY: Performed by: SURGERY

## 2022-11-10 PROCEDURE — 7100000010 HC PHASE II RECOVERY - FIRST 15 MIN: Performed by: SURGERY

## 2022-11-10 PROCEDURE — 3700000001 HC ADD 15 MINUTES (ANESTHESIA): Performed by: SURGERY

## 2022-11-10 PROCEDURE — 2580000003 HC RX 258: Performed by: ANESTHESIOLOGY

## 2022-11-10 PROCEDURE — 13160 SEC CLSR SURG WND/DEHSN XTN: CPT | Performed by: SURGERY

## 2022-11-10 PROCEDURE — 3700000000 HC ANESTHESIA ATTENDED CARE: Performed by: SURGERY

## 2022-11-10 PROCEDURE — 6360000002 HC RX W HCPCS

## 2022-11-10 RX ORDER — DEXAMETHASONE SODIUM PHOSPHATE 4 MG/ML
INJECTION, SOLUTION INTRA-ARTICULAR; INTRALESIONAL; INTRAMUSCULAR; INTRAVENOUS; SOFT TISSUE PRN
Status: DISCONTINUED | OUTPATIENT
Start: 2022-11-10 | End: 2022-11-10 | Stop reason: SDUPTHER

## 2022-11-10 RX ORDER — HYDROMORPHONE HYDROCHLORIDE 2 MG/ML
0.5 INJECTION, SOLUTION INTRAMUSCULAR; INTRAVENOUS; SUBCUTANEOUS EVERY 5 MIN PRN
Status: DISCONTINUED | OUTPATIENT
Start: 2022-11-10 | End: 2022-11-10 | Stop reason: HOSPADM

## 2022-11-10 RX ORDER — BUPIVACAINE HYDROCHLORIDE 5 MG/ML
INJECTION, SOLUTION EPIDURAL; INTRACAUDAL PRN
Status: DISCONTINUED | OUTPATIENT
Start: 2022-11-10 | End: 2022-11-10 | Stop reason: HOSPADM

## 2022-11-10 RX ORDER — LIDOCAINE HYDROCHLORIDE 20 MG/ML
INJECTION, SOLUTION EPIDURAL; INFILTRATION; INTRACAUDAL; PERINEURAL PRN
Status: DISCONTINUED | OUTPATIENT
Start: 2022-11-10 | End: 2022-11-10 | Stop reason: SDUPTHER

## 2022-11-10 RX ORDER — OXYCODONE HYDROCHLORIDE 5 MG/1
5 TABLET ORAL PRN
Status: DISCONTINUED | OUTPATIENT
Start: 2022-11-10 | End: 2022-11-10 | Stop reason: HOSPADM

## 2022-11-10 RX ORDER — SODIUM CHLORIDE 0.9 % (FLUSH) 0.9 %
5-40 SYRINGE (ML) INJECTION PRN
Status: DISCONTINUED | OUTPATIENT
Start: 2022-11-10 | End: 2022-11-10 | Stop reason: HOSPADM

## 2022-11-10 RX ORDER — OXYCODONE HYDROCHLORIDE AND ACETAMINOPHEN 5; 325 MG/1; MG/1
1 TABLET ORAL EVERY 6 HOURS PRN
Qty: 20 TABLET | Refills: 0 | Status: SHIPPED | OUTPATIENT
Start: 2022-11-10 | End: 2022-11-15

## 2022-11-10 RX ORDER — SODIUM CHLORIDE 0.9 % (FLUSH) 0.9 %
5-40 SYRINGE (ML) INJECTION EVERY 12 HOURS SCHEDULED
Status: DISCONTINUED | OUTPATIENT
Start: 2022-11-10 | End: 2022-11-10 | Stop reason: HOSPADM

## 2022-11-10 RX ORDER — SODIUM CHLORIDE 9 MG/ML
INJECTION, SOLUTION INTRAVENOUS PRN
Status: DISCONTINUED | OUTPATIENT
Start: 2022-11-10 | End: 2022-11-10 | Stop reason: HOSPADM

## 2022-11-10 RX ORDER — OXYCODONE HYDROCHLORIDE 5 MG/1
10 TABLET ORAL PRN
Status: DISCONTINUED | OUTPATIENT
Start: 2022-11-10 | End: 2022-11-10 | Stop reason: HOSPADM

## 2022-11-10 RX ORDER — MIDAZOLAM HYDROCHLORIDE 2 MG/2ML
2 INJECTION, SOLUTION INTRAMUSCULAR; INTRAVENOUS
Status: DISCONTINUED | OUTPATIENT
Start: 2022-11-10 | End: 2022-11-10 | Stop reason: HOSPADM

## 2022-11-10 RX ORDER — ONDANSETRON 2 MG/ML
INJECTION INTRAMUSCULAR; INTRAVENOUS PRN
Status: DISCONTINUED | OUTPATIENT
Start: 2022-11-10 | End: 2022-11-10 | Stop reason: SDUPTHER

## 2022-11-10 RX ORDER — LIDOCAINE HYDROCHLORIDE 10 MG/ML
1 INJECTION, SOLUTION INFILTRATION; PERINEURAL
Status: DISCONTINUED | OUTPATIENT
Start: 2022-11-10 | End: 2022-11-10 | Stop reason: HOSPADM

## 2022-11-10 RX ORDER — ONDANSETRON 2 MG/ML
4 INJECTION INTRAMUSCULAR; INTRAVENOUS
Status: DISCONTINUED | OUTPATIENT
Start: 2022-11-10 | End: 2022-11-10 | Stop reason: HOSPADM

## 2022-11-10 RX ORDER — SODIUM CHLORIDE, SODIUM LACTATE, POTASSIUM CHLORIDE, CALCIUM CHLORIDE 600; 310; 30; 20 MG/100ML; MG/100ML; MG/100ML; MG/100ML
INJECTION, SOLUTION INTRAVENOUS CONTINUOUS
Status: DISCONTINUED | OUTPATIENT
Start: 2022-11-10 | End: 2022-11-10 | Stop reason: HOSPADM

## 2022-11-10 RX ORDER — HYDROMORPHONE HYDROCHLORIDE 2 MG/ML
0.25 INJECTION, SOLUTION INTRAMUSCULAR; INTRAVENOUS; SUBCUTANEOUS EVERY 5 MIN PRN
Status: DISCONTINUED | OUTPATIENT
Start: 2022-11-10 | End: 2022-11-10 | Stop reason: HOSPADM

## 2022-11-10 RX ORDER — ACETAMINOPHEN 500 MG
1000 TABLET ORAL ONCE
Status: COMPLETED | OUTPATIENT
Start: 2022-11-10 | End: 2022-11-10

## 2022-11-10 RX ORDER — PROPOFOL 10 MG/ML
INJECTION, EMULSION INTRAVENOUS PRN
Status: DISCONTINUED | OUTPATIENT
Start: 2022-11-10 | End: 2022-11-10 | Stop reason: SDUPTHER

## 2022-11-10 RX ADMIN — LIDOCAINE HYDROCHLORIDE 100 MG: 20 INJECTION, SOLUTION EPIDURAL; INFILTRATION; INTRACAUDAL; PERINEURAL at 07:43

## 2022-11-10 RX ADMIN — FENTANYL CITRATE 50 MCG: 50 INJECTION INTRAMUSCULAR; INTRAVENOUS at 07:50

## 2022-11-10 RX ADMIN — Medication 2000 MG: at 07:50

## 2022-11-10 RX ADMIN — FENTANYL CITRATE 50 MCG: 50 INJECTION INTRAMUSCULAR; INTRAVENOUS at 08:06

## 2022-11-10 RX ADMIN — PROPOFOL 200 MG: 10 INJECTION, EMULSION INTRAVENOUS at 07:44

## 2022-11-10 RX ADMIN — ACETAMINOPHEN 1000 MG: 500 TABLET ORAL at 07:02

## 2022-11-10 RX ADMIN — ONDANSETRON 4 MG: 2 INJECTION INTRAMUSCULAR; INTRAVENOUS at 07:57

## 2022-11-10 RX ADMIN — PROPOFOL 200 MG: 10 INJECTION, EMULSION INTRAVENOUS at 07:43

## 2022-11-10 RX ADMIN — DEXAMETHASONE SODIUM PHOSPHATE 4 MG: 4 INJECTION, SOLUTION INTRAMUSCULAR; INTRAVENOUS at 07:57

## 2022-11-10 RX ADMIN — SODIUM CHLORIDE, POTASSIUM CHLORIDE, SODIUM LACTATE AND CALCIUM CHLORIDE: 600; 310; 30; 20 INJECTION, SOLUTION INTRAVENOUS at 07:04

## 2022-11-10 ASSESSMENT — PAIN - FUNCTIONAL ASSESSMENT
PAIN_FUNCTIONAL_ASSESSMENT: 0-10
PAIN_FUNCTIONAL_ASSESSMENT: 0-10
PAIN_FUNCTIONAL_ASSESSMENT: NONE - DENIES PAIN

## 2022-11-10 ASSESSMENT — PAIN DESCRIPTION - DESCRIPTORS: DESCRIPTORS: ACHING

## 2022-11-10 NOTE — BRIEF OP NOTE
Brief Postoperative Note      Patient: Aliya Holliday  YOB: 1980  MRN: [id number]    Date of Procedure: 11/10/2022    Pre-Op Diagnosis: Open wound of chest wall, uncomplicated, unspecified laterality, subsequent encounter [S21.109D]    Post-Op Diagnosis: Same       Procedure(s):  SECONDARY CLOSURE OF AN OPEN ANTERIOR CHEST WALL LESION    Surgeon(s):  Bella Solorio MD    Assistant:  * No surgical staff found *    Anesthesia: General    Estimated Blood Loss (mL): 5 cc;s    Complications: None    Specimens:   * No specimens in log *    Implants:  * No implants in log *      Drains: * No LDAs found *    Findings: See dictated note    Electronically signed by Wayne Bernard MD on 11/10/2022 at 8:22 AM

## 2022-11-10 NOTE — ANESTHESIA PROCEDURE NOTES
Airway  Date/Time: 11/10/2022 7:46 AM  Urgency: elective    Airway not difficult    General Information and Staff    Patient location during procedure: OR  Performed: resident/CRNA     Indications and Patient Condition  Indications for airway management: anesthesia  Spontaneous ventilation: present  Sedation level: deep  Preoxygenated: yes  Patient position: sniffing  MILS not maintained throughout  Mask difficulty assessment: vent by bag mask    Final Airway Details  Final airway type: supraglottic airway      Successful airway: oropharyngeal  Size 5     Number of attempts at approach: 1  Ventilation between attempts: supraglottic airway  Number of other approaches attempted: 0    Additional Comments  Atraumatic  no

## 2022-11-10 NOTE — INTERVAL H&P NOTE
Update History & Physical    The patient's History and Physical of 11/8/22 was reviewed with the patient and I examined the patient. There was no change. The surgical site was confirmed by the patient and me. Plan: The risks, benefits, expected outcome, and alternative to the recommended procedure have been discussed with the patient. Patient understands and wants to proceed with the procedure.      Electronically signed by Stephanie Gregorio MD on 11/10/2022 at 6:40 AM

## 2022-11-10 NOTE — OP NOTE
[address] [facility]  OPERATIVE REPORT    Name:  Wm Noble  MR#:  [id number]  :  1980  ACCOUNT #:  [de-identified]  DATE OF SERVICE:  11/10/2022      PREOPERATIVE DIAGNOSIS:  Open wound anterior chest wall. POSTOPERATIVE DIAGNOSIS:  Open wound, anterior chest wall. PROCEDURE PERFORMED:  Secondary closure of an anterior chest wall wound. SURGEON:  Jagdish Aldrich MD    ASSISTANT:  None. ANESTHESIA:  General endotracheal anesthesia with an LMA plus 30 mL of 0.5% Marcaine used as local around the wound site. ESTIMATED BLOOD LOSS:  5 mL. SPECIMENS REMOVED:  None. COMPLICATIONS:  None. IMPLANTS: None. DRAINS:  None. HISTORY:  This 72-year-old male was originally referred by Dr. Carmelita Ward. He had an umbilical hernia and also had an epidermal inclusion cyst of the anterior chest wall. He had the umbilical hernia repaired and the chest wall lesion excised on 10/07/2022. The chest wall lesion measured 4 cm x 2 cm x 2 cm. The umbilical hernia site healed well without any kind of problems. We left the sutures and staples in the chest wall for a long time and unfortunately the wound edges . We took the rest of the staples out and  more. 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 I offered secondary closure of this open wound. He accepted and was scheduled for today. PROCEDURE:  The patient was seen in the preop area. The area was marked. He was then brought to room #90 at [address] [place] [address] operating room. He was placed on the operating table in supine position where general anesthesia via an LMA was administered. I did a time-out identifying the patient, surgeon, procedures, and birth date of 1980. When everyone in the room agreed, we began the procedure. I used the electrocautery to raise flaps.   I closed the deep tissue with interrupted sutures of 0 Vicryl. I closed the skin with a combination of 0 Nurolon and 2-0 nylon. We did simple sutures for this. Once the wound was closed, we injected 30 mL of 0.5% Marcaine around the wound site. It was covered with 4x4s and Tegaderm was placed over this. The patient was extubated, brought to recovery room in stable condition having tolerated the procedure well.         MD HERMINIA [id number]  D:  11/10/2022 8:29  T:  11/10/2022 12:30  JOB #:  [id number]

## 2022-11-10 NOTE — ANESTHESIA POSTPROCEDURE EVALUATION
Department of Anesthesiology  Postprocedure Note    Patient: Ac Tam  MRN: [id number]  YOB: 1980  Date of evaluation: 11/10/2022      Procedure Summary     Date: 11/10/22 Room / Location: [facility] MAIN OR 09 / [facility] MAIN OR    Anesthesia Start: 6325 Anesthesia Stop: 1785    Procedure: SECONDARY CLOSURE OF AN OPEN ANTERIOR CHEST WALL LESION (Abdomen) Diagnosis:       Open wound of chest wall, uncomplicated, unspecified laterality, subsequent encounter      (Open wound of chest wall, uncomplicated, unspecified laterality, subsequent encounter [S21.109D])    Providers: Miguel Amin MD Responsible Provider: Zandra Sandhoff, MD    Anesthesia Type: general ASA Status: 2          Anesthesia Type: No value filed. Casandra Phase I: Casandra Score: 10    Casandra Phase II:        Anesthesia Post Evaluation    Patient location during evaluation: PACU  Patient participation: complete - patient participated  Level of consciousness: awake and alert  Airway patency: patent  Nausea & Vomiting: no nausea and no vomiting  Complications: no  Cardiovascular status: hemodynamically stable  Respiratory status: acceptable  Hydration status: euvolemic  Comments: Blood pressure 124/72, pulse 63, temperature 97.7 °F (36.5 °C), temperature source Skin, resp. rate (P) 15, height 5' 10\" (1.778 m), weight 208 lb (94.3 kg), SpO2 100 %.       Pt stable for discharge from PACU  Multimodal analgesia pain management approach

## 2022-11-10 NOTE — DISCHARGE INSTRUCTIONS
1. Diet as tolerated . 2. Showering is allowed, but no tub baths, hot tubs or swimming. 3. Drainage is common from the wounds. Change the dressings as needed. Call our office if the wounds become reddened, tender, feel warm to the touch or pus starts to drain from the wound. 4. Take prescribed pain medication as directed, usually Percocet, Norco, Ultram or Dilaudid. Take over the counter medication for minor pain. 5. Ice may be applied intermittently to the surgical site or sites. 6. Call or office, [phone], if problems arise. 7. Follow up in the office at the assigned time. 8. Resume all medications as taken per surgery, unless specifically instructed not to take certain ones. 9. No lifting more than 25 pounds until told otherwise. 10. Driving is allowed 3 days after surgery as long as you feel comfortable enough to drive and have not taken any prescription pain medication prior to driving. ACTIVITY  As tolerated and as directed by your doctor. Bathe or shower as directed by your doctor. DIET  Clear liquids until no nausea or vomiting; then light diet for the first day. Advance to regular diet on second day, unless your doctor orders otherwise. If nausea and vomiting continues, call your doctor. PAIN  Take pain medication as directed by your doctor. Call your doctor if pain is NOT relieved by medication. DO NOT take aspirin of blood thinners unless directed by your doctor. MEDICATION INTERACTION:During your procedure you potentially received a medication or medications which may reduce the effectiveness of oral contraceptives. Please consider other forms of contraception for 1 month following your procedure if you are currently using oral contraceptives as your primary form of birth control.  In addition to this, we recommend continuing your oral contraceptive as prescribed, unless otherwise instructed by your physician, during this time      [place] DOCTOR IF   Excessive bleeding that does not stop after holding pressure over the area  Temperature of 101 degrees F or above  Excessive redness, swelling or bruising, and/ or green or yellow, smelly discharge from incision    After general anesthesia or intravenous sedation, for 24 hours or while taking prescription Narcotics:  Limit your activities  A responsible adult needs to be with you for the next 24 hours  Do not drive and operate hazardous machinery  Do not make important personal or business decisions  Do not drink alcoholic beverages  If you have not urinated within 8 hours after discharge, and you are experiencing discomfort from urinary retention, please go to the nearest ED. If you have sleep apnea and have a CPAP machine, please use it for all naps and sleeping. Please use caution when taking narcotics and any of your home medications that may cause drowsiness. *  Please give a list of your current medications to your Primary Care Provider. *  Please update this list whenever your medications are discontinued, doses are      changed, or new medications (including over-the-counter products) are added. *  Please carry medication information at all times in case of emergency situations. These are general instructions for a healthy lifestyle:  No smoking/ No tobacco products/ Avoid exposure to second hand smoke  Surgeon General's Warning:  Quitting smoking now greatly reduces serious risk to your health. Obesity, smoking, and sedentary lifestyle greatly increases your risk for illness  A healthy diet, regular physical exercise & weight monitoring are important for maintaining a healthy lifestyle    You may be retaining fluid if you have a history of heart failure or if you experience any of the following symptoms:  Weight gain of 3 pounds or more overnight or 5 pounds in a week, increased swelling in our hands or feet or shortness of breath while lying flat in bed.   Please call your doctor as soon as you notice any of these symptoms; do not wait until your next office visit.

## 2022-11-10 NOTE — ANESTHESIA PRE PROCEDURE
Department of Anesthesiology  Preprocedure Note       Name:  Isaiah Kowalski   Age:  40 y.o.  :  1980                                          MRN:  [id number]         Date:  11/10/2022      Surgeon: Lola Mckeon):  Dave Adhikari MD    Procedure: Procedure(s):  SECONDARY CLOSURE OF AN OPEN ANTERIOR CHEST WALL LESION    Medications prior to admission:   Prior to Admission medications    Medication Sig Start Date End Date Taking? Authorizing Provider   oxyCODONE-acetaminophen (PERCOCET) 5-325 MG per tablet Take 1 tablet by mouth every 4 hours as needed for Pain. Historical Provider, MD   melatonin 3 MG TABS tablet Take 3 mg by mouth daily    Historical Provider, MD   acetaminophen (TYLENOL) 500 MG tablet Take 1,000 mg by mouth in the morning and at bedtime 10/6/22 10/7/22  Historical Provider, MD   cetirizine (ZYRTEC) 10 MG tablet Take 10 mg by mouth in the morning. Historical Provider, MD   ibuprofen (ADVIL;MOTRIN) 200 MG tablet Take 200 mg by mouth every 6 hours as needed for Pain    Historical Provider, MD       Current medications:    No current facility-administered medications for this visit. No current outpatient medications on file.      Facility-Administered Medications Ordered in Other Visits   Medication Dose Route Frequency Provider Last Rate Last Admin    ceFAZolin (ANCEF) 2000 mg in sterile water 20 mL IV syringe  2,000 mg IntraVENous On Call to Sophia Goddard MD        lidocaine 1 % injection 1 mL  1 mL IntraDERmal Once PRN LUCILA De La Garza MD        acetaminophen (TYLENOL) tablet 1,000 mg  1,000 mg Oral Once L Pushpa De La Garza MD        lactated ringers infusion   IntraVENous Continuous L Pushpa De La Garza MD        sodium chloride flush 0.9 % injection 5-40 mL  5-40 mL IntraVENous 2 times per day LUCILA De La Garza MD        sodium chloride flush 0.9 % injection 5-40 mL  5-40 mL IntraVENous PRN LUCILA De La Garza MD        0.9 % sodium chloride infusion   IntraVENous PRN LUCILA Sawyer MD        midazolam PF (VERSED) injection 2 mg  2 mg IntraVENous Once PRN LUCILA Sawyer MD           Allergies:  No Known Allergies    Problem List:    Patient Active Problem List   Diagnosis Code    ADD (attention deficit disorder) F98.8    Hypertriglyceridemia E78.1    Blood pressure elevated without history of HTN C83.7    Umbilical hernia without obstruction and without gangrene K42.9    Open wound of chest wall without complication J54.237P       Past Medical History:        Diagnosis Date    Palpitations     Prolonged emergence from general anesthesia 1997    after ACL repair    Rash     states broke out in rash several days after surgery 10/7/22       Past Surgical History:        Procedure Laterality Date    ANTERIOR CRUCIATE LIGAMENT REPAIR Left 1997    CHEST WALL RESECTION N/A 10/07/2022    cyst removed- CHEST WALL LESION BIOPSY EXCISION performed by Jabier Wick MD at [address] [place] [address] ARTHROSCOPY Left 2009    ORTHOPEDIC SURGERY  2002    right ankle- screws/pins    UMBILICAL HERNIA REPAIR N/A 49/98/1077    HERNIA UMBILICAL REPAIR WITH MESH performed by Jabier Wick MD at [facility]       Social History:    Social History     Tobacco Use    Smoking status: Never    Smokeless tobacco: Never   Substance Use Topics    Alcohol use: Yes     Alcohol/week: 5.0 standard drinks     Types: 5 Cans of beer per week                                Counseling given: Not Answered      Vital Signs (Current): There were no vitals filed for this visit.                                            BP Readings from Last 3 Encounters:   11/10/22 129/85   10/07/22 (!) 101/57   08/04/22 128/76       NPO Status:                                                                                 BMI:   Wt Readings from Last 3 Encounters:   11/10/22 208 lb (94.3 kg)   10/07/22 204 lb 3.2 oz (92.6 kg)   08/04/22 212 lb 1.6 oz (96.2 kg)     There is no height or weight on file to calculate BMI.    CBC: No results found for: WBC, RBC, HGB, HCT, MCV, RDW, PLT    CMP: No results found for: NA, K, CL, CO2, BUN, CREATININE, GFRAA, AGRATIO, LABGLOM, GLUCOSE, GLU, PROT, CALCIUM, BILITOT, ALKPHOS, AST, ALT    POC Tests: No results for input(s): POCGLU, POCNA, POCK, POCCL, POCBUN, POCHEMO, POCHCT in the last 72 hours. Coags: No results found for: PROTIME, INR, APTT    HCG (If Applicable): No results found for: PREGTESTUR, PREGSERUM, HCG, HCGQUANT     ABGs: No results found for: PHART, PO2ART, YHG2MLT, LMV0YUL, BEART, A5WVCAFS     Type & Screen (If Applicable):  No results found for: LABABO, LABRH    Drug/Infectious Status (If Applicable):  No results found for: HIV, HEPCAB    COVID-19 Screening (If Applicable): No results found for: COVID19        Anesthesia Evaluation  Patient summary reviewed  Airway: Mallampati: II  TM distance: >3 FB   Neck ROM: full     Dental: normal exam         Pulmonary:Negative Pulmonary ROS and normal exam  breath sounds clear to auscultation                             Cardiovascular:Negative CV ROS  Exercise tolerance: good (>4 METS),           Rhythm: regular  Rate: normal                 ROS comment: Pt denies recent CP, SOB or changes in functional status     Neuro/Psych:   Negative Neuro/Psych ROS              GI/Hepatic/Renal: Neg GI/Hepatic/Renal ROS           ROS comment: Obesity. Endo/Other:                      ROS comment: Obesity Abdominal:              PE comment: Deferred   Vascular: negative vascular ROS. Other Findings:             Anesthesia Plan      general     ASA 2       Induction: intravenous. Anesthetic plan and risks discussed with patient and spouse.                         Reggie Sweeney MD   11/10/2022

## 2022-11-20 NOTE — PROGRESS NOTES
poDate: 2022      Name: Savannah Landau      MRN: [id number]       : 1980       Age: 40 y.o. Sex: male        Rubio Spain MD       CC:  No chief complaint on file. HPI:  The patient presents for the first post-op visit s/p secondary closure of an open chest wall wound done on 11/10/22. The patient had an epidermal inclusion cyst originally removed on 10/7/22.     22: The patient reports pain at the site which can reach 10 out of 10 at times. No drainage. No fever or chills. Physical Exam:     There were no vitals taken for this visit. General: Alert, oriented, cooperative white male in no acute distress. Neck: Supple, trachea midline, no appreciable thyromegaly  Resp: Breathing is  non-labored. Lungs clear to auscultation without wheezing or rhonchi   CV: RRR. No murmurs, rubs or gallops appreciated. Abd: soft non-tender and non-distended without peritoneal signs. +bs    Chest: wound intact. No drainage or cellulitis noted. Assessment/Plan:  Savannah Landau is a 40 y.o. male who is s/p secondary closure of an open chest wall wound done on 11/10/22. 1. Leave all of the sutures. 2. Light activities. 3. Follow-up in 2 weeks.     Martínez Mackey MD  [facility]   2022  10:37 AM

## 2022-11-22 ENCOUNTER — OFFICE VISIT (OUTPATIENT)
Dept: SURGERY | Age: 42
End: 2022-11-22

## 2022-11-22 DIAGNOSIS — S21.109D: Primary | ICD-10-CM

## 2022-11-22 PROCEDURE — 99024 POSTOP FOLLOW-UP VISIT: CPT | Performed by: SURGERY

## 2022-11-22 RX ORDER — OXYCODONE HYDROCHLORIDE AND ACETAMINOPHEN 5; 325 MG/1; MG/1
1 TABLET ORAL EVERY 6 HOURS PRN
Qty: 20 TABLET | Refills: 0 | Status: SHIPPED | OUTPATIENT
Start: 2022-11-22 | End: 2022-11-27

## 2022-12-02 NOTE — PROGRESS NOTES
poDate: 2022      Name: Candance Copping      MRN: [id number]       : 1980       Age: 40 y.o. Sex: male        Eric Chavez MD       CC:  No chief complaint on file. HPI:  The patient presents for the second post-op visit s/p secondary closure of an open chest wall wound done on 11/10/22. The patient had an epidermal inclusion cyst originally removed on 10/7/22.     22: The patient reports pain at the site which can reach 10 out of 10 at times. No drainage. No fever or chills. 22: Less pain. Small amount of drainage. Physical Exam:     There were no vitals taken for this visit. General: Alert, oriented, cooperative white male in no acute distress. Neck: Supple, trachea midline, no appreciable thyromegaly  Resp: Breathing is  non-labored. Lungs clear to auscultation without wheezing or rhonchi   CV: RRR. No murmurs, rubs or gallops appreciated. Abd: soft non-tender and non-distended without peritoneal signs. +bs    Chest: wound intact. No drainage or cellulitis noted. Assessment/Plan:  Candance Copping is a 40 y.o. male who is s/p secondary closure of an open chest wall wound done on 11/10/22. 1. Remove half of the sutures. POD #26    2. Light activities. 3. Follow-up 2 weeks.     Yeni Sepulveda MD  [facility]   2022  7:14 PM

## 2022-12-06 ENCOUNTER — OFFICE VISIT (OUTPATIENT)
Dept: SURGERY | Age: 42
End: 2022-12-06

## 2022-12-06 DIAGNOSIS — M79.89 SOFT TISSUE MASS: ICD-10-CM

## 2022-12-06 DIAGNOSIS — S21.109D: Primary | ICD-10-CM

## 2022-12-06 PROCEDURE — 99024 POSTOP FOLLOW-UP VISIT: CPT | Performed by: SURGERY

## 2022-12-06 RX ORDER — OXYCODONE HYDROCHLORIDE AND ACETAMINOPHEN 5; 325 MG/1; MG/1
1 TABLET ORAL EVERY 6 HOURS PRN
Qty: 20 TABLET | Refills: 0 | Status: SHIPPED | OUTPATIENT
Start: 2022-12-06 | End: 2022-12-11

## 2022-12-17 NOTE — PROGRESS NOTES
poDate: 2022      Name: Lizz Edge      MRN: [id number]       : 1980       Age: 40 y.o. Sex: male        Marcos Hernandez MD       CC:  No chief complaint on file. HPI:  The patient presents for the third post-op visit s/p secondary closure of an open chest wall wound done on 11/10/22. The patient had an epidermal inclusion cyst originally removed on 10/7/22.     22: The patient reports pain at the site which can reach 10 out of 10 at times. No drainage. No fever or chills. 22: Less pain. Small amount of drainage. 22: No drainage. No pain. Physical Exam:     There were no vitals taken for this visit. General: Alert, oriented, cooperative white male in no acute distress. Neck: Supple, trachea midline, no appreciable thyromegaly  Resp: Breathing is  non-labored. Lungs clear to auscultation without wheezing or rhonchi   CV: RRR. No murmurs, rubs or gallops appreciated. Abd: soft non-tender and non-distended without peritoneal signs. +bs    Chest: wound closed, no drainage or signs of infection. Assessment/Plan:  Lizz Edge is a 40 y.o. male who is s/p secondary closure of an open chest wall wound done on 11/10/22. 1. Remove all of the sutures. POD #40    2. May resume all pre-op activities. 3. Follow-up prn.     Clementina Hameed MD  [facility]   2022  10:14 AM

## 2022-12-20 ENCOUNTER — OFFICE VISIT (OUTPATIENT)
Dept: SURGERY | Age: 42
End: 2022-12-20

## 2022-12-20 DIAGNOSIS — M79.89 SOFT TISSUE MASS: ICD-10-CM

## 2022-12-20 DIAGNOSIS — S21.109D: Primary | ICD-10-CM

## 2022-12-20 PROCEDURE — 99024 POSTOP FOLLOW-UP VISIT: CPT | Performed by: SURGERY

## (undated) DEVICE — SUTURE ETHLN SZ 2-0 L18IN NONABSORBABLE BLK L26MM PS 3/8 585H

## (undated) DEVICE — BLADE CLIPPER GEN PURP NS

## (undated) DEVICE — APPLICATOR MEDICATED 26 CC SOLUTION HI LT ORNG CHLORAPREP

## (undated) DEVICE — Device

## (undated) DEVICE — ELECTRODE PT RET AD L9FT HI MOIST COND ADH HYDRGEL CORDED

## (undated) DEVICE — GARMENT,MEDLINE,DVT,INT,CALF,MED, GEN2: Brand: MEDLINE

## (undated) DEVICE — PAD,NON-ADHERENT,3X8,STERILE,LF,1/PK: Brand: MEDLINE

## (undated) DEVICE — GLOVE SURG SZ 75 CRM LTX FREE POLYISOPRENE POLYMER BEAD ANTI

## (undated) DEVICE — 3M™ TEGADERM™ TRANSPARENT FILM DRESSING FRAME STYLE, 1626W, 4 IN X 4-3/4 IN (10 CM X 12 CM), 50/CT 4CT/CASE: Brand: 3M™ TEGADERM™

## (undated) DEVICE — NEEDLE HYPO 21GA L1.5IN INTRAMUSCULAR S STL LATCH BVL UP

## (undated) DEVICE — SHEET, T, LAPAROTOMY, STERILE: Brand: MEDLINE

## (undated) DEVICE — SUTURE VCRL SZ 3-0 L27IN ABSRB UD L26MM SH 1/2 CIR J416H

## (undated) DEVICE — SURGICAL PROCEDURE PACK BASIC ST FRANCIS

## (undated) DEVICE — PREMIUM WET SKIN PREP TRAY: Brand: MEDLINE INDUSTRIES, INC.

## (undated) DEVICE — SUTURE SZ 0 27IN 5/8 CIR UR-6  TAPER PT VIOLET ABSRB VICRYL J603H

## (undated) DEVICE — 3M™ TEGADERM™ TRANSPARENT FILM DRESSING FRAME STYLE, 1628, 6 IN X 8 IN (15 CM X 20 CM), 10/CT 8CT/CASE: Brand: 3M™ TEGADERM™

## (undated) DEVICE — SUTURE MCRYL SZ 4-0 L27IN ABSRB UD L19MM PS-2 1/2 CIR PRIM Y426H

## (undated) DEVICE — SOLUTION IRRIG 1000ML 0.9% SOD CHL USP POUR PLAS BTL

## (undated) DEVICE — SUTURE PROL SZ 0 L30IN NONABSORBABLE BLU L26MM CT-2 1/2 CIR 8412H

## (undated) DEVICE — CANISTER, RIGID, 2000CC: Brand: MEDLINE INDUSTRIES, INC.

## (undated) DEVICE — SOLUTION IRRIG 1000ML 09% SOD CHL USP PIC PLAS CONTAINER